# Patient Record
Sex: FEMALE | Race: ASIAN | Employment: FULL TIME | URBAN - METROPOLITAN AREA
[De-identification: names, ages, dates, MRNs, and addresses within clinical notes are randomized per-mention and may not be internally consistent; named-entity substitution may affect disease eponyms.]

---

## 2018-12-28 PROBLEM — Z34.90 TERM PREGNANCY: Status: ACTIVE | Noted: 2018-12-28

## 2021-05-13 ENCOUNTER — TELEPHONE (OUTPATIENT)
Dept: FAMILY MEDICINE CLINIC | Age: 36
End: 2021-05-13

## 2021-05-13 NOTE — TELEPHONE ENCOUNTER
Pt parent reports pt was crying for \"almost 3 hours\" tracy 30 minutes ago. Parent reports giving pt Tylenol tracy 15 minutes PTA. Pt no longer crying at this time. This is not the correct patient.